# Patient Record
Sex: FEMALE | Race: WHITE | NOT HISPANIC OR LATINO | URBAN - METROPOLITAN AREA
[De-identification: names, ages, dates, MRNs, and addresses within clinical notes are randomized per-mention and may not be internally consistent; named-entity substitution may affect disease eponyms.]

---

## 2024-01-31 ENCOUNTER — TELEPHONE (OUTPATIENT)
Age: 49
End: 2024-01-31

## 2024-01-31 NOTE — TELEPHONE ENCOUNTER
Rec'd call from patient requesting NEW for FULL BODY.    [facility] link was sent via email     Wait list process was explained and understanding was verbalized.     Created wait list for patient.